# Patient Record
Sex: FEMALE | Race: WHITE
[De-identification: names, ages, dates, MRNs, and addresses within clinical notes are randomized per-mention and may not be internally consistent; named-entity substitution may affect disease eponyms.]

---

## 2018-04-21 ENCOUNTER — HOSPITAL ENCOUNTER (OUTPATIENT)
Dept: HOSPITAL 17 - PHEDDLT | Age: 68
Setting detail: OBSERVATION
LOS: 1 days | Discharge: HOME | End: 2018-04-22
Attending: HOSPITALIST | Admitting: HOSPITALIST
Payer: MEDICARE

## 2018-04-21 VITALS — DIASTOLIC BLOOD PRESSURE: 77 MMHG | SYSTOLIC BLOOD PRESSURE: 154 MMHG

## 2018-04-21 VITALS — OXYGEN SATURATION: 100 %

## 2018-04-21 VITALS
TEMPERATURE: 97.8 F | HEART RATE: 78 BPM | RESPIRATION RATE: 20 BRPM | SYSTOLIC BLOOD PRESSURE: 172 MMHG | DIASTOLIC BLOOD PRESSURE: 70 MMHG | OXYGEN SATURATION: 100 %

## 2018-04-21 DIAGNOSIS — I10: ICD-10-CM

## 2018-04-21 DIAGNOSIS — R94.31: ICD-10-CM

## 2018-04-21 DIAGNOSIS — Z90.710: ICD-10-CM

## 2018-04-21 DIAGNOSIS — Z82.49: ICD-10-CM

## 2018-04-21 DIAGNOSIS — Z88.6: ICD-10-CM

## 2018-04-21 DIAGNOSIS — R07.89: Primary | ICD-10-CM

## 2018-04-21 LAB
TROPONIN I SERPL-MCNC: (no result) NG/ML (ref 0.02–0.05)
TROPONIN I SERPL-MCNC: (no result) NG/ML (ref 0.02–0.05)

## 2018-04-21 PROCEDURE — 85730 THROMBOPLASTIN TIME PARTIAL: CPT

## 2018-04-21 PROCEDURE — 82550 ASSAY OF CK (CPK): CPT

## 2018-04-21 PROCEDURE — 71046 X-RAY EXAM CHEST 2 VIEWS: CPT

## 2018-04-21 PROCEDURE — 99285 EMERGENCY DEPT VISIT HI MDM: CPT

## 2018-04-21 PROCEDURE — G0378 HOSPITAL OBSERVATION PER HR: HCPCS

## 2018-04-21 PROCEDURE — 84484 ASSAY OF TROPONIN QUANT: CPT

## 2018-04-21 PROCEDURE — 83735 ASSAY OF MAGNESIUM: CPT

## 2018-04-21 PROCEDURE — 85610 PROTHROMBIN TIME: CPT

## 2018-04-21 PROCEDURE — 85025 COMPLETE CBC W/AUTO DIFF WBC: CPT

## 2018-04-21 PROCEDURE — 93017 CV STRESS TEST TRACING ONLY: CPT

## 2018-04-21 PROCEDURE — 80048 BASIC METABOLIC PNL TOTAL CA: CPT

## 2018-04-21 PROCEDURE — A9502 TC99M TETROFOSMIN: HCPCS

## 2018-04-21 PROCEDURE — 78452 HT MUSCLE IMAGE SPECT MULT: CPT

## 2018-04-21 PROCEDURE — 93005 ELECTROCARDIOGRAM TRACING: CPT

## 2018-04-21 PROCEDURE — 85379 FIBRIN DEGRADATION QUANT: CPT

## 2018-04-21 RX ADMIN — Medication SCH ML: at 20:38

## 2018-04-22 VITALS
OXYGEN SATURATION: 100 % | RESPIRATION RATE: 20 BRPM | TEMPERATURE: 97 F | HEART RATE: 75 BPM | DIASTOLIC BLOOD PRESSURE: 63 MMHG | SYSTOLIC BLOOD PRESSURE: 132 MMHG

## 2018-04-22 VITALS
DIASTOLIC BLOOD PRESSURE: 62 MMHG | OXYGEN SATURATION: 99 % | SYSTOLIC BLOOD PRESSURE: 136 MMHG | HEART RATE: 72 BPM | TEMPERATURE: 97 F | RESPIRATION RATE: 16 BRPM

## 2018-04-22 VITALS
SYSTOLIC BLOOD PRESSURE: 126 MMHG | HEART RATE: 73 BPM | OXYGEN SATURATION: 99 % | RESPIRATION RATE: 20 BRPM | TEMPERATURE: 97.3 F | DIASTOLIC BLOOD PRESSURE: 60 MMHG

## 2018-04-22 VITALS
TEMPERATURE: 96.5 F | DIASTOLIC BLOOD PRESSURE: 67 MMHG | RESPIRATION RATE: 18 BRPM | OXYGEN SATURATION: 98 % | SYSTOLIC BLOOD PRESSURE: 147 MMHG | HEART RATE: 90 BPM

## 2018-04-22 VITALS — HEART RATE: 63 BPM

## 2018-04-22 VITALS — OXYGEN SATURATION: 97 %

## 2018-04-22 RX ADMIN — Medication SCH ML: at 08:20

## 2018-04-22 NOTE — TR
Date Performed: 04/22/2018       Time Performed: 11:07:36

 

DOCTOR:      Jose South 

 

DRUG LIST:     

CLINICAL HISTORY:     

REASON FOR TEST:     

REASON FOR ENDING:     

OBSERVATION:     

CONCLUSION:     

COMMENTS:      Lexiscan stress test was performed under standard four minute protocol.  Radionuclide 
was injected one minute prior to ending the test. No electrocardiographic abormalities were present t
o suggest ischemia. Nuclear imaging and interpretation are pending.

## 2018-04-22 NOTE — RADRPT
EXAM DATE/TIME:  04/22/2018 10:05 

 

[facility] COMPARISON:     

No previous studies available for comparison.

 

 

INDICATIONS :     

Chest pain for 2 hrs. Angina. 

                           

 

DOSE:     

27.4 mCi Tc99m Myoview at stress.

                     8.5 mCi Tc99m Myoview at rest.

                     0.4 mg Lexiscan

                       

 

 

STRESS SYMPTOMS:      

Dyspnea and headache.

                       

 

 

EJECTION FRACTION:      

> 70%

                       

 

MEDICAL HISTORY :     

Hypertension.   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

 

SURGICAL HISTORY :      

Hysterectomy. Cholecystectomy.  

 

ENCOUNTER:     

Initial

 

ACUITY:     

1 day

 

PAIN SCALE:     

3/10

 

LOCATION:      

Left chest 

 

TECHNIQUE:     

The patient underwent pharmacologic stress with infusion of prescribed dose.  Continuous ECG tracing 
was monitored during stress.  Gated SPECT imaging was performed after stress and conventional SPECT i
maging was performed at rest.  The examination was performed on a SPECT/CT scanner, both attenuation 
and non-corrected datasets were reviewed.

 

FINDINGS:     

 

DISTRIBUTION:     

The maximum perfused segment at stress is in the anterolateral wall.

 

PERFUSION STUDY:     

The pattern of perfusion at stress shows approximately 10% redistribution in the low inferolateral wa
ll which would not be considered significant..

 

GATED STUDY:     

There is intact wall motion and thickening without hypokinetic or dyskinetic segments. 

 

CONCLUSION:     

1. No scintigraphic findings of infarct or ischemia.

2. Excellent wall motion throughout with estimated ejection fraction of 76%

 

RISK CATEGORY:     Low (<1% Annual Mortality Rate)

 

 

 

 

 Power Nicholson MD on April 22, 2018 at 12:54           

Board Certified Radiologist.

 This report was verified electronically.

## 2018-04-22 NOTE — HHI.DCPOC
Discharge Care Plan


Diagnosis:  


(1) Chest pain


(2) Hypertension


Goals to Promote Your Health


* To prevent worsening of your condition and complications


* To maintain your health at the optimal level


Directions to Meet Your Goals


*** Take your medications as prescribed


*** Follow your dietary instruction


*** Follow activity as directed








*** Keep your appointments as scheduled


*** Take your immunizations and boosters as scheduled


*** If your symptoms worsen call your PCP, if no PCP go to Urgent Care Center 

or Emergency Room***


*** Smoking is Dangerous to Your Health. Avoid second hand smoke***


***Call the 24-hour hour crisis hotline for domestic abuse at 1-210.223.6527***











Yecenia Garcia Apr 22, 2018 13:52

## 2018-04-22 NOTE — EKG
Date Performed: 04/21/2018       Time Performed: 19:15:41

 

PTAGE:      67 years

 

EKG:      Sinus rhythm 

 

 POSSIBLE LEFT ATRIAL ENLARGEMENT BORDERLINE ECG Since 

 

 PREVIOUS TRACING            , no significant change noted

 

DOCTOR:   Jose South  Interpretating Date/Time  04/22/2018 11:01:11

## 2018-04-22 NOTE — EKG
Date Performed: 04/21/2018       Time Performed: 21:59:31

 

PTAGE:      67 years

 

EKG:      Sinus rhythm 

 

 POSSIBLE LEFT ATRIAL ENLARGEMENT BORDERLINE ECG

 

PREVIOUS TRACING       : 04/21/2018 19.15 Since previous tracing, no significant change noted

 

DOCTOR:   Jose South  Interpretating Date/Time  04/22/2018 11:00:42

## 2018-04-22 NOTE — HHI.HP
__________________________________________________





HPI


[facility]


[facility]


Primary Care Physician


No Primary Care Physician


Admission Diagnosis


Chest pain


Diagnoses:  


(1) Chest pain


Chief Complaint:  


Chest pain


Travel History


International Travel<30 Days:  No


Contact w/Intl Traveler <30 Da:  No


Traveled to Known Affected Are:  No


History of Present Illness


This is a pleasant 67-year-old female with a known medical history of 

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 

to the ED with complaints of chest pain.  Patient states that while she was 

using the restroom last evening she developed a midsternal chest pain that felt 

like a "charley horse" and pressure like "deep inside her chest".  Patient 

denies ever having this type of pain before.  She rated the pain a 4 out of 10 

at its worse on pain scale.  Denied any associated nausea, vomiting, 

diaphoresis or shortness of breath.  Denies any known aggravating or 

alleviating factors, states it just went away on its own.  Denied any radiation 

of pain.  Patient did take a Tums may be gastric reflux which was ineffective.  

Patient denies any personal history of CAD or cardiovascular disease.  Denies 

any tobacco or alcohol use.  Does admit to family history of cardiovascular 

disease.  Denies any recent illness including fever, chills, cough, shortness 

of breath, abdominal pain, nausea, vomiting, diarrhea dysuria.  Patient did 

undergo a cardiac stress test 3 years ago for complaints of dyspnea which was 

reportedly negative.  She did follow with a cardiologist, has not seen him in 2 

years.  Is out of town with her  from [place].





Review of Systems


Constitutional:  DENIES: Fatigue, Chills


Endocrine:  DENIES: Polyuria


Eyes:  DENIES: Blurred vision, Diplopia


Respiratory:  DENIES: Cough, Sputum production, Shortness of breath


Cardiovascular:  COMPLAINS OF: Chest pain, DENIES: Palpitations


Gastrointestinal:  DENIES: Abdominal pain, Black stools, Bloody stools, 

Constipation, Diarrhea, Nausea, Vomiting, Difficulty Swallowing, Anorexia


Musculoskeletal:  DENIES: Joint pain


Hematologic/lymphatic:  DENIES: Bruising


Psychiatric:  COMPLAINS OF: Anxiety


Except as stated in HPI:  all other systems reviewed are Neg





Past Family Social History


Past Medical History


Hypertension


Past Surgical History


Cholecystectomy


Hysterectomy


Reported Medications


Active


Reported


Lisinopril 20 Mg Tab 15 Mg PO EVERY OTHER DAY


Allergies:  


Coded Allergies:  


     Iodinated Contrast- Oral and IV Dye (Verified  Allergy, Severe, Anaphylaxis

, 18)


 lack liver enyzme


     Penicillins (Verified  Allergy, Severe, Anaphylaxis, 18)


 lack liver enyzme


     Proton Pump Inhibitors (Verified  Allergy, Severe, Anaphylaxis, 18)


 lack liver enyzme


     Sulfa (Sulfonamide Antibiotics) (Verified  Allergy, Severe, Anaphylaxis, )


 lack liver enyzme


     acetaminophen (Verified  Allergy, Severe, Anaphylaxis, 18)


 lack liver enyzme


     amitriptyline (Verified  Allergy, Severe, Anaphylaxis, 18)


 lack liver enyzme


     amphetamine (Verified  Allergy, Severe, Anaphylaxis, 18)


 lack liver enyzme


     aripiprazole (Verified  Allergy, Severe, Anaphylaxis, 18)


 lack liver enyzme


     aspirin (Verified  Allergy, Severe, Anaphylaxis, 18)


 lack liver enyzme


     atenolol (Verified  Allergy, Severe, Anaphylaxis, 18)


 lack liver enyzme


     atomoxetine (Verified  Allergy, Severe, Anaphylaxis, 18)


 lack liver enyzme


     atorvastatin (Verified  Allergy, Severe, Anaphylaxis, 18)


 lack liver enyzme


     carisoprodol (Verified  Allergy, Severe, Anaphylaxis, 18)


 lack liver enyzme


     carvedilol (Verified  Allergy, Severe, Anaphylaxis, 18)


 lack liver enyzme


     chlorpheniramine (Verified  Allergy, Severe, Anaphylaxis, 18)


 lack liver enyzme


     cimetidine (Verified  Allergy, Severe, Anaphylaxis, 18)


 lack liver enyzme


     citalopram (Verified  Allergy, Severe, Anaphylaxis, 18)


 lack liver enyzme


     clindamycin (Verified  Allergy, Severe, Hives, 18)


     clomipramine (Verified  Allergy, Severe, Anaphylaxis, 18)


 lack liver enyzme


     codeine (Verified  Allergy, Severe, Anaphylaxis, 18)


 lack liver enyzme


     desipramine (Verified  Allergy, Severe, Anaphylaxis, 18)


 lack liver enyzme


     dextroamphetamine (Verified  Allergy, Severe, Anaphylaxis, 18)


 lack liver enyzme


     diazepam (Verified  Allergy, Severe, Anaphylaxis, 18)


 lack liver enyzme


     diphenhydramine (Verified  Allergy, Severe, Anaphylaxis, 18)


 lack liver enyzme


     duloxetine (Verified  Allergy, Severe, Anaphylaxis, 18)


 lack liver enyzme


     flecainide (Verified  Allergy, Severe, Anaphylaxis, 18)


 lack liver enyzme


     fluoxetine (Verified  Allergy, Severe, Anaphylaxis, 18)


 lack liver enyzme


     fluvoxamine (Verified  Allergy, Severe, Anaphylaxis, 18)


 lack liver enyzme


     haloperidol (Verified  Allergy, Severe, Anaphylaxis, 18)


 lack liver enyzme


     imipramine (Verified  Allergy, Severe, Anaphylaxis, 18)


 lack liver enyzme


     indomethacin (Verified  Allergy, Severe, Anaphylaxis, 18)


 lack liver enyzme


     lansoprazole (Verified  Allergy, Severe, Anaphylaxis, 18)


 lack liver enyzme


     lidocaine (Verified  Allergy, Severe, Anaphylaxis, 18)


 lack liver enyzme


     maprotiline (Verified  Allergy, Severe, Anaphylaxis, 18)


 lack liver enyzme


     metoclopramide (Verified  Allergy, Severe, Anaphylaxis, 18)


 lack liver enyzme


     metoprolol (Verified  Allergy, Severe, Anaphylaxis, 18)


 lack liver enyzme


     mexiletine (Verified  Allergy, Severe, Anaphylaxis, 18)


 lack liver enyzme


     naproxen (Verified  Allergy, Severe, Anaphylaxis, 18)


 lack liver enyzme


     nitrofurantoin (Verified  Allergy, Severe, Anaphylaxis, 18)


 lack liver enyzme


     nortriptyline (Verified  Allergy, Severe, Anaphylaxis, 18)


 lack liver enyzme


     omeprazole (Verified  Allergy, Severe, Anaphylaxis, 18)


 lack liver enyzme


     ondansetron (Verified  Allergy, Severe, Anaphylaxis, 18)


 lack liver enyzme


     oxycodone (Verified  Allergy, Severe, Anaphylaxis, 18)


 lack liver enyzme


     pantoprazole (Verified  Allergy, Severe, Anaphylaxis, 18)


 lack liver enyzme


     paroxetine (Verified  Allergy, Severe, Anaphylaxis, 18)


 lack liver enyzme


     perphenazine (Verified  Allergy, Severe, Anaphylaxis, 18)


 lack liver enyzme


     phenytoin (Verified  Allergy, Severe, Anaphylaxis, 18)


 lack liver enyzme


     prednisone (Verified  Allergy, Severe, Anaphylaxis, 18)


 lack liver enyzme


     prochlorperazine (Verified  Allergy, Severe, Anaphylaxis, 18)


 lack liver enyzme


     progesterone (Verified  Allergy, Severe, Anaphylaxis, 18)


 lack liver enyzme


     promethazine (Verified  Allergy, Severe, Anaphylaxis, 18)


 lack liver enyzme


     propafenone (Verified  Allergy, Severe, Anaphylaxis, 18)


 lack liver enyzme


     propranolol (Verified  Allergy, Severe, Anaphylaxis, 18)


 lack liver enyzme


     rabeprazole (Verified  Allergy, Severe, Anaphylaxis, 18)


 lack liver enyzme


     risperidone (Verified  Allergy, Severe, Anaphylaxis, 18)


 lack liver enyzme


     tamoxifen (Verified  Allergy, Severe, Anaphylaxis, 18)


 lack liver enyzme


     thioridazine (Verified  Allergy, Severe, Anaphylaxis, 18)


 lack liver enyzme


     timolol (Verified  Allergy, Severe, Anaphylaxis, 18)


 lack liver enyzme


     tramadol (Verified  Allergy, Severe, Anaphylaxis, 18)


 lack liver enyzme


     trimipramine (Verified  Allergy, Severe, Anaphylaxis, 18)


 lack liver enyzme


     tropisetron (Verified  Allergy, Severe, Anaphylaxis, 18)


 lack liver enyzme


     venlafaxine (Verified  Allergy, Severe, Anaphylaxis, 18)


 lack liver enyzme


     warfarin (Verified  Allergy, Severe, Anaphylaxis, 18)


 lack liver enyzme


     zuclopenthixol (Verified  Allergy, Severe, Anaphylaxis, 18)


 lack liver enyzme


Uncoded Allergies:  


     antihistamines (Allergy, Severe, Anaphylaxis, 18)


 lack liver enyzme


Active Ordered Medications





Current Medications








 Medications


  (Trade)  Dose


 Ordered  Sig/Harsh


 Route  Start Time


 Stop Time Status Last Admin


 


  (NS Flush)  2 ml  UNSCH  PRN


 IV FLUSH  18 19:00


     


 


 


  (NS Flush)  2 ml  BID


 IV FLUSH  18 21:00


    18 08:20


 


 


  (Nitrostat Sl)  0.4 mg  Q5M  PRN


 SL  18 19:00


     


 


 


  (Prinivil)  15 mg  EVERY OTHER  DAY


 PO  18 09:00


     


 








Family History


Maternal medical history significant for cardiovascular disease, history of 

CABG  at the age of 73.


Social History


Denies any tobacco, alcohol or illicit drug use.





Physical Exam


Vital Signs





Vital Signs








  Date Time  Temp Pulse Resp B/P (MAP) Pulse Ox O2 Delivery O2 Flow Rate FiO2


 


18 04:00 97.3 73 20 126/60 (82) 99   


 


18 00:56  63      


 


18 00:00 97.0 75 20 132/63 (86) 100   


 


18 20:23    154/77 (102)    


 


18 20:10     100 Nasal Cannula 2.00 


 


18 20:00 97.8 78 20 172/70 (104) 100   








Physical Exam


GENERAL: Well-developed, well-nourished patient in NAD.


SKIN: Warm and dry. No rash.


HEAD:  Normocephalic. Atraumatic.


EYES: Pupils equal and round. No scleral icterus. No injection or drainage. 


ENT: No nasal bleeding or discharge.  Mucous membranes pink and moist.


NECK: Supple. Trachea midline.  


CARDIOVASCULAR: Regular rate and rhythm.  S1, S2 noted. No murmur appreciated.  

No chest pain to palpation.


RESPIRATORY: No accessory muscle use. Clear to auscultation. Breath sounds 

equal bilaterally.  


GASTROINTESTINAL: Abdomen soft, non-tender, nondistended. Normoactive bowel 

sounds x4.


MUSCULOSKELETAL: No obvious deformities. Extremities without clubbing, cyanosis

, or edema. 


NEUROLOGICAL: Awake and alert. No obvious cranial nerve deficits.  Motor 

grossly within normal limits. 5/5 muscle strength in bilateral upper and lower 

extremities.  Normal speech.


PSYCHIATRIC: Appropriate mood and affect; insight and judgment normal.


Laboratory





Laboratory Tests








Test


  18


19:18 18


22:03


 


Total Creatine Kinase 75  74 


 


Troponin I LESS THAN 0.02  LESS THAN 0.02 











Septic Shock Reassessment


Septic shock perfusion:  reassessment completed





Caprini VTE Risk Assessment


Caprini VTE Risk Assessment:  Mod/High Risk (score >= 2)


Caprini Risk Assessment Model











 Point Value = 1          Point Value = 2  Point Value = 3  Point Value = 5


 


Age 41-60


Minor surgery


BMI > 25 kg/m2


Swollen legs


Varicose veins


Pregnancy or postpartum


History of unexplained or recurrent


   spontaneous 


Oral contraceptives or hormone


   replacement


Sepsis (< 1 month)


Serious lung disease, including


   pneumonia (< 1 month)


Abnormal pulmonary function


Acute myocardial infarction


Congestive heart failure (< 1 month)


History of inflammatory bowel disease


Medical patient at bed rest Age 61-74


Arthroscopic surgery


Major open surgery (> 45 min)


Laparoscopic surgery (> 45 min)


Malignancy


Confined to bed (> 72 hours)


Immobilizing plaster cast


Central venous access Age >= 75


History of VTE


Family history of VTE


Factor V Leiden


Prothrombin 02337E


Lupus anticoagulant


Anticardiolipin antibodies


Elevated serum homocysteine


Heparin-induced thrombocytopenia


Other congenital or acquired


   thrombophilia Stroke (< 1 month)


Elective arthroplasty


Hip, pelvis, or leg fracture


Acute spinal cord injury (< 1 month)








Prophylaxis Regimen











   Total Risk


Factor Score Risk Level Prophylaxis Regimen


 


0-1      Low Early ambulation


 


2 Moderate Order ONE of the following:


*Sequential Compression Device (SCD)


*Heparin 5000 units SQ BID


 


3-4 Higher Order ONE of the following medications:


*Heparin 5000 units SQ TID


*Enoxaparin/Lovenox 40 mg SQ daily (WT < 150 kg, CrCl > 30 mL/min)


*Enoxaparin/Lovenox 30 mg SQ daily (WT < 150 kg, CrCl > 10-29 mL/min)


*Enoxaparin/Lovenox 30 mg SQ BID (WT < 150 kg, CrCl > 30 mL/min)


AND/OR


*Sequential Compression Device (SCD)


 


5 or more Highest Order ONE of the following medications:


*Heparin 5000 units SQ TID (Preferred with Epidurals)


*Enoxaparin/Lovenox 40 mg SQ daily (WT < 150 kg, CrCl > 30 mL/min)


*Enoxaparin/Lovenox 30 mg SQ daily (WT < 150 kg, CrCl > 10-29 mL/min)


*Enoxaparin/Lovenox 30 mg SQ BID (WT < 150 kg, CrCl > 30 mL/min)


AND


*Sequential Compression Device (SCD)











Assessment and Plan


Problem List:  


(1) Chest pain


ICD Code:  R07.9 - Chest pain, unspecified


Plan:  Patient has been admitted for observation and the chest pain center 

unit.  Serial EKGs and serial troponins have been ordered for ruling out ACS 

purposes.  Troponin trend flat.  EKG reviewed showing sinus rhythm with 

controlled heart rate, no ST changes to indicate any ischemia.  Chest pain has 

now resolved.  Chest x-ray reviewed showing no acute cardiopulmonary process.  

Cardiac telemetry continued, monitor for any arrhythmias.  Supplemental O2 as 

needed, patient is comfortable on room air.  Patient is allergic to aspirin.  

Nitroglycerin available as needed for chest pain.  Patient will undergo a 

nuclear stress test to further rule out any ischemia.  Further hospitalization 

and treatment plan will depend on nuclear imaging results.  Patient is stable 

at this time and agreeable to the plan.





(2) Hypertension


ICD Code:  I10 - Essential (primary) hypertension


Plan:  Blood pressure elevated upon presentation.  We will continue home 

lisinopril.  Monitor blood pressure trends.





DVT prophylaxis: SCDs.





Assessment and Plan


Nuclear imaging results reviewed showing 70%. No ischemia. Patient updated and 

chest pain resolved. Will be discharged and encouraged to follow up with PCP. 

Return to ED if symptoms persist or worsen.











Yecenia Garcia 2018 07:55